# Patient Record
Sex: MALE | Race: WHITE | NOT HISPANIC OR LATINO | Employment: OTHER | URBAN - NONMETROPOLITAN AREA
[De-identification: names, ages, dates, MRNs, and addresses within clinical notes are randomized per-mention and may not be internally consistent; named-entity substitution may affect disease eponyms.]

---

## 2023-09-19 ENCOUNTER — OFFICE VISIT (OUTPATIENT)
Dept: PRIMARY CARE | Facility: CLINIC | Age: 73
End: 2023-09-19
Payer: MEDICARE

## 2023-09-19 VITALS
HEART RATE: 56 BPM | HEIGHT: 72 IN | DIASTOLIC BLOOD PRESSURE: 78 MMHG | BODY MASS INDEX: 36.96 KG/M2 | SYSTOLIC BLOOD PRESSURE: 138 MMHG | WEIGHT: 272.9 LBS

## 2023-09-19 DIAGNOSIS — E66.01 CLASS 2 SEVERE OBESITY WITH SERIOUS COMORBIDITY AND BODY MASS INDEX (BMI) OF 37.0 TO 37.9 IN ADULT, UNSPECIFIED OBESITY TYPE (MULTI): ICD-10-CM

## 2023-09-19 DIAGNOSIS — N40.0 BENIGN PROSTATIC HYPERPLASIA, UNSPECIFIED WHETHER LOWER URINARY TRACT SYMPTOMS PRESENT: ICD-10-CM

## 2023-09-19 DIAGNOSIS — F41.8 ANXIETY WITH DEPRESSION: ICD-10-CM

## 2023-09-19 DIAGNOSIS — H26.9 CATARACT, UNSPECIFIED CATARACT TYPE, UNSPECIFIED LATERALITY: ICD-10-CM

## 2023-09-19 DIAGNOSIS — Z00.00 ROUTINE GENERAL MEDICAL EXAMINATION AT HEALTH CARE FACILITY: ICD-10-CM

## 2023-09-19 DIAGNOSIS — I10 BENIGN HYPERTENSION: Primary | ICD-10-CM

## 2023-09-19 LAB
ESTIMATED AVERAGE GLUCOSE FOR HBA1C EXTERNAL: NORMAL
HEMOGLOBIN A1C/HEMOGLOBIN TOTAL IN BLOOD EXTERNAL: NORMAL

## 2023-09-19 PROCEDURE — 1036F TOBACCO NON-USER: CPT | Performed by: FAMILY MEDICINE

## 2023-09-19 PROCEDURE — 3075F SYST BP GE 130 - 139MM HG: CPT | Performed by: FAMILY MEDICINE

## 2023-09-19 PROCEDURE — 1170F FXNL STATUS ASSESSED: CPT | Performed by: FAMILY MEDICINE

## 2023-09-19 PROCEDURE — 3078F DIAST BP <80 MM HG: CPT | Performed by: FAMILY MEDICINE

## 2023-09-19 PROCEDURE — 99213 OFFICE O/P EST LOW 20 MIN: CPT | Performed by: FAMILY MEDICINE

## 2023-09-19 PROCEDURE — 1157F ADVNC CARE PLAN IN RCRD: CPT | Performed by: FAMILY MEDICINE

## 2023-09-19 PROCEDURE — 3008F BODY MASS INDEX DOCD: CPT | Performed by: FAMILY MEDICINE

## 2023-09-19 PROCEDURE — 1160F RVW MEDS BY RX/DR IN RCRD: CPT | Performed by: FAMILY MEDICINE

## 2023-09-19 PROCEDURE — 1159F MED LIST DOCD IN RCRD: CPT | Performed by: FAMILY MEDICINE

## 2023-09-19 PROCEDURE — 1123F ACP DISCUSS/DSCN MKR DOCD: CPT | Performed by: FAMILY MEDICINE

## 2023-09-19 PROCEDURE — G0439 PPPS, SUBSEQ VISIT: HCPCS | Performed by: FAMILY MEDICINE

## 2023-09-19 RX ORDER — SERTRALINE HYDROCHLORIDE 50 MG/1
50 TABLET, FILM COATED ORAL DAILY
COMMUNITY

## 2023-09-19 RX ORDER — LISINOPRIL AND HYDROCHLOROTHIAZIDE 10; 12.5 MG/1; MG/1
1 TABLET ORAL DAILY
COMMUNITY
Start: 2017-11-06 | End: 2023-09-19 | Stop reason: DRUGHIGH

## 2023-09-19 RX ORDER — LISINOPRIL AND HYDROCHLOROTHIAZIDE 10; 12.5 MG/1; MG/1
2 TABLET ORAL DAILY
Qty: 180 TABLET | Refills: 0 | Status: SHIPPED | OUTPATIENT
Start: 2023-09-19

## 2023-09-19 RX ORDER — TAMSULOSIN HYDROCHLORIDE 0.4 MG/1
0.4 CAPSULE ORAL DAILY
COMMUNITY
Start: 2017-11-06

## 2023-09-19 ASSESSMENT — ACTIVITIES OF DAILY LIVING (ADL)
BATHING: INDEPENDENT
GROCERY_SHOPPING: INDEPENDENT
DRESSING: INDEPENDENT
MANAGING_FINANCES: INDEPENDENT
TAKING_MEDICATION: INDEPENDENT
DOING_HOUSEWORK: INDEPENDENT

## 2023-09-19 ASSESSMENT — PATIENT HEALTH QUESTIONNAIRE - PHQ9
2. FEELING DOWN, DEPRESSED OR HOPELESS: NOT AT ALL
1. LITTLE INTEREST OR PLEASURE IN DOING THINGS: NOT AT ALL
SUM OF ALL RESPONSES TO PHQ9 QUESTIONS 1 AND 2: 0

## 2023-09-19 NOTE — PROGRESS NOTES
Subjective   Patient ID: Chemo Polanco is a 73 y.o. male who presents for pre op clearance for bilateral cataract surgery 10/5  HPI     Review of Systems    Objective   There were no vitals taken for this visit.    Physical Exam    Assessment/Plan

## 2023-09-19 NOTE — PROGRESS NOTES
Subjective   Reason for Visit: Chemo Polanco is an 73 y.o. male here for a Medicare Wellness visit.     Past Medical, Surgical, and Family History reviewed and updated in chart.    Reviewed all medications by prescribing practitioner or clinical pharmacist (such as prescriptions, OTCs, herbal therapies and supplements) and documented in the medical record.    Here to get re-established - last seen here in 2018.  He goes to [facility] for most of his primary care.  He has a h/o HTN, BPH, anxiety.    He is getting ready to have cataract surgery.  Otherwise he is doing well.   He has had several surgeries in the past - no issues with anesthesia.      Advance directives:. Advanced Care Planning discussed and documented advance care plan or surrogate decision maker documented in the medical record. Patient has living will. Patient has healthcare POA.     Colonoscopy 2017 - repeat recommended in 3 years due to family history.        Patient Care Team:  Niya Horvath MD as PCP - General (Family Medicine)         Objective   Vitals:  /78   Pulse 56   Ht 1.829 m (6')   Wt 124 kg (272 lb 14.4 oz)   BMI 37.01 kg/m²       Physical Exam  Vitals reviewed.   Constitutional:       General: He is not in acute distress.  Cardiovascular:      Rate and Rhythm: Normal rate and regular rhythm.      Heart sounds: No murmur heard.  Pulmonary:      Effort: Pulmonary effort is normal. No respiratory distress.      Breath sounds: Normal breath sounds.   Skin:     General: Skin is warm and dry.   Neurological:      General: No focal deficit present.      Mental Status: He is alert. Mental status is at baseline.         Assessment/Plan   Problem List Items Addressed This Visit       Benign hypertension - Primary    Benign prostatic hyperplasia    Anxiety with depression    Class 2 severe obesity with serious comorbidity and body mass index (BMI) of 37.0 to 37.9 in adult (CMS/[facility])     Other Visit Diagnoses       Cataract,  unspecified cataract type, unspecified laterality        Routine general medical examination at health care facility

## 2023-09-19 NOTE — PATIENT INSTRUCTIONS
He is medically stable for surgery.  [facility].  Follow up here in 1 year and prn.

## 2023-09-25 ENCOUNTER — TELEPHONE (OUTPATIENT)
Dept: PRIMARY CARE | Facility: CLINIC | Age: 73
End: 2023-09-25
Payer: MEDICARE

## 2023-09-25 DIAGNOSIS — Z01.818 PRE-OP EVALUATION: Primary | ICD-10-CM

## 2023-09-25 NOTE — TELEPHONE ENCOUNTER
Pt called.  wants him to have an EKG for her surgery clearance. Can you order this for him and he can have done at hosp.

## 2023-10-02 ENCOUNTER — PREP FOR PROCEDURE (OUTPATIENT)
Dept: OPERATING ROOM | Facility: HOSPITAL | Age: 73
End: 2023-10-02
Payer: MEDICARE

## 2023-10-02 DIAGNOSIS — H25.812 COMBINED FORMS OF AGE-RELATED CATARACT OF LEFT EYE: Primary | ICD-10-CM

## 2023-10-02 RX ORDER — TETRACAINE HYDROCHLORIDE 5 MG/ML
1 SOLUTION OPHTHALMIC ONCE
Status: CANCELLED | OUTPATIENT
Start: 2023-10-05

## 2023-10-02 RX ORDER — KETOROLAC TROMETHAMINE 5 MG/ML
1 SOLUTION OPHTHALMIC ONCE
Status: CANCELLED | OUTPATIENT
Start: 2023-10-05

## 2023-10-02 RX ORDER — POVIDONE-IODINE 5 %
1 SOLUTION, NON-ORAL OPHTHALMIC (EYE) ONCE
Status: CANCELLED | OUTPATIENT
Start: 2023-10-05

## 2023-10-02 RX ORDER — PREDNISOLONE ACETATE 10 MG/ML
1 SUSPENSION/ DROPS OPHTHALMIC ONCE
Status: DISCONTINUED | OUTPATIENT
Start: 2023-10-05 | End: 2023-10-10 | Stop reason: HOSPADM

## 2023-10-02 RX ORDER — MOXIFLOXACIN 5 MG/ML
1 SOLUTION/ DROPS OPHTHALMIC ONCE
Status: DISCONTINUED | OUTPATIENT
Start: 2023-10-05 | End: 2023-10-10 | Stop reason: HOSPADM

## 2023-10-02 NOTE — H&P (VIEW-ONLY)
History Of Present Illness  Chemo Polanco is a 73 y.o. male presenting with Combined form age related cataract left eye.     Past Medical History  Hypertension, BPH, Anxiety disorder    Surgical History  He has a past surgical history that includes Spine surgery; Cholecystectomy; Hernia repair; Total knee arthroplasty (Bilateral); and Hemorrhoid surgery. Gallbladder removal     Social History  He reports that he has quit smoking. His smoking use included cigarettes. He has never used smokeless tobacco. No history on file for alcohol use and drug use.    Family History  No family history on file.     Allergies  Patient has no known allergies.    Review of Systems   Constitutional: Negative.    HENT: Negative.     Eyes: Negative.    Respiratory: Negative.     Cardiovascular: Negative.         Hypertension   Gastrointestinal: Negative.    Endocrine: Negative.    Genitourinary: Negative.    Musculoskeletal: Negative.    Skin: Negative.    Allergic/Immunologic: Negative.    Neurological: Negative.    Hematological: Negative.    Psychiatric/Behavioral:  The patient is nervous/anxious.           Physical Exam     Last Recorded Vitals  There were no vitals taken for this visit.    Relevant Results             Assessment/Plan   Problem List Items Addressed This Visit             ICD-10-CM       Eye    Combined forms of age-related cataract of left eye - Primary H25.812    Relevant Orders    Case Request Operating Room: Extraction Cataract with Insertion Intraocular Lens (Completed)       Vijay Garcia MD

## 2023-10-05 ENCOUNTER — ANESTHESIA (OUTPATIENT)
Dept: OPERATING ROOM | Facility: HOSPITAL | Age: 73
End: 2023-10-05
Payer: MEDICARE

## 2023-10-05 ENCOUNTER — ANESTHESIA EVENT (OUTPATIENT)
Dept: OPERATING ROOM | Facility: HOSPITAL | Age: 73
End: 2023-10-05
Payer: MEDICARE

## 2023-10-05 ENCOUNTER — HOSPITAL ENCOUNTER (OUTPATIENT)
Facility: HOSPITAL | Age: 73
Setting detail: OUTPATIENT SURGERY
Discharge: HOME | End: 2023-10-05
Attending: OPHTHALMOLOGY | Admitting: OPHTHALMOLOGY
Payer: MEDICARE

## 2023-10-05 VITALS
DIASTOLIC BLOOD PRESSURE: 92 MMHG | HEART RATE: 44 BPM | BODY MASS INDEX: 36.7 KG/M2 | RESPIRATION RATE: 18 BRPM | OXYGEN SATURATION: 95 % | WEIGHT: 270.95 LBS | HEIGHT: 72 IN | SYSTOLIC BLOOD PRESSURE: 164 MMHG | TEMPERATURE: 97.4 F

## 2023-10-05 DIAGNOSIS — H25.812 COMBINED FORMS OF AGE-RELATED CATARACT OF LEFT EYE: Primary | ICD-10-CM

## 2023-10-05 PROCEDURE — 3600000003 HC OR TIME - INITIAL BASE CHARGE - PROCEDURE LEVEL THREE: Performed by: OPHTHALMOLOGY

## 2023-10-05 PROCEDURE — 3700000001 HC GENERAL ANESTHESIA TIME - INITIAL BASE CHARGE: Performed by: OPHTHALMOLOGY

## 2023-10-05 PROCEDURE — C1780 LENS, INTRAOCULAR (NEW TECH): HCPCS | Performed by: OPHTHALMOLOGY

## 2023-10-05 PROCEDURE — 3600000008 HC OR TIME - EACH INCREMENTAL 1 MINUTE - PROCEDURE LEVEL THREE: Performed by: OPHTHALMOLOGY

## 2023-10-05 PROCEDURE — 2760000001 HC OR 276 NO HCPCS: Performed by: OPHTHALMOLOGY

## 2023-10-05 PROCEDURE — 2500000001 HC RX 250 WO HCPCS SELF ADMINISTERED DRUGS (ALT 637 FOR MEDICARE OP): Performed by: OPHTHALMOLOGY

## 2023-10-05 PROCEDURE — 7100000010 HC PHASE TWO TIME - EACH INCREMENTAL 1 MINUTE: Performed by: OPHTHALMOLOGY

## 2023-10-05 PROCEDURE — 7100000009 HC PHASE TWO TIME - INITIAL BASE CHARGE: Performed by: OPHTHALMOLOGY

## 2023-10-05 PROCEDURE — 2720000007 HC OR 272 NO HCPCS: Performed by: OPHTHALMOLOGY

## 2023-10-05 PROCEDURE — 3700000002 HC GENERAL ANESTHESIA TIME - EACH INCREMENTAL 1 MINUTE: Performed by: OPHTHALMOLOGY

## 2023-10-05 PROCEDURE — 2500000004 HC RX 250 GENERAL PHARMACY W/ HCPCS (ALT 636 FOR OP/ED): Performed by: ANESTHESIOLOGY

## 2023-10-05 PROCEDURE — 2580000001 HC RX 258 IV SOLUTIONS: Performed by: ANESTHESIOLOGY

## 2023-10-05 DEVICE — IMPLANTABLE DEVICE: Type: IMPLANTABLE DEVICE | Site: EYE | Status: FUNCTIONAL

## 2023-10-05 RX ORDER — TETRACAINE HYDROCHLORIDE 5 MG/ML
1 SOLUTION OPHTHALMIC ONCE
Status: COMPLETED | OUTPATIENT
Start: 2023-10-05 | End: 2023-10-05

## 2023-10-05 RX ORDER — SODIUM CHLORIDE, SODIUM LACTATE, POTASSIUM CHLORIDE, CALCIUM CHLORIDE 600; 310; 30; 20 MG/100ML; MG/100ML; MG/100ML; MG/100ML
100 INJECTION, SOLUTION INTRAVENOUS CONTINUOUS
Status: CANCELLED | OUTPATIENT
Start: 2023-10-05

## 2023-10-05 RX ORDER — POVIDONE-IODINE 5 %
1 SOLUTION, NON-ORAL OPHTHALMIC (EYE) ONCE
Status: COMPLETED | OUTPATIENT
Start: 2023-10-05 | End: 2023-10-05

## 2023-10-05 RX ORDER — KETOROLAC TROMETHAMINE 5 MG/ML
1 SOLUTION OPHTHALMIC ONCE
Status: COMPLETED | OUTPATIENT
Start: 2023-10-05 | End: 2023-10-05

## 2023-10-05 RX ORDER — MIDAZOLAM HYDROCHLORIDE 1 MG/ML
INJECTION, SOLUTION INTRAMUSCULAR; INTRAVENOUS AS NEEDED
Status: DISCONTINUED | OUTPATIENT
Start: 2023-10-05 | End: 2023-10-05

## 2023-10-05 RX ADMIN — TETRACAINE HYDROCHLORIDE 1 DROP: 5 SOLUTION OPHTHALMIC at 10:39

## 2023-10-05 RX ADMIN — PHENYLEPHRINE HYDROCHLORIDE 1 DROP: 100 SOLUTION/ DROPS OPHTHALMIC at 10:40

## 2023-10-05 RX ADMIN — MIDAZOLAM 2 MG: 1 INJECTION INTRAMUSCULAR; INTRAVENOUS at 11:46

## 2023-10-05 RX ADMIN — POLYVINYL ALCOHOL, POVIDONE 1 DROP: 14; 6 SOLUTION/ DROPS OPHTHALMIC at 10:40

## 2023-10-05 RX ADMIN — POVIDONE-IODINE 1 APPLICATION: 5 SOLUTION OPHTHALMIC at 10:40

## 2023-10-05 RX ADMIN — SODIUM CHLORIDE, SODIUM LACTATE, POTASSIUM CHLORIDE, AND CALCIUM CHLORIDE: 600; 310; 30; 20 INJECTION, SOLUTION INTRAVENOUS at 11:56

## 2023-10-05 RX ADMIN — KETOROLAC TROMETHAMINE 1 DROP: 5 SOLUTION OPHTHALMIC at 10:40

## 2023-10-05 SDOH — HEALTH STABILITY: MENTAL HEALTH: CURRENT SMOKER: 0

## 2023-10-05 ASSESSMENT — ENCOUNTER SYMPTOMS
GASTROINTESTINAL NEGATIVE: 1
NEUROLOGICAL NEGATIVE: 1
ALLERGIC/IMMUNOLOGIC NEGATIVE: 1
CARDIOVASCULAR NEGATIVE: 1
CONSTITUTIONAL NEGATIVE: 1
RESPIRATORY NEGATIVE: 1
HEMATOLOGIC/LYMPHATIC NEGATIVE: 1
ENDOCRINE NEGATIVE: 1
NERVOUS/ANXIOUS: 1
EYES NEGATIVE: 1
MUSCULOSKELETAL NEGATIVE: 1

## 2023-10-05 ASSESSMENT — COLUMBIA-SUICIDE SEVERITY RATING SCALE - C-SSRS
6. HAVE YOU EVER DONE ANYTHING, STARTED TO DO ANYTHING, OR PREPARED TO DO ANYTHING TO END YOUR LIFE?: NO
1. IN THE PAST MONTH, HAVE YOU WISHED YOU WERE DEAD OR WISHED YOU COULD GO TO SLEEP AND NOT WAKE UP?: NO
2. HAVE YOU ACTUALLY HAD ANY THOUGHTS OF KILLING YOURSELF?: NO

## 2023-10-05 ASSESSMENT — PAIN - FUNCTIONAL ASSESSMENT
PAIN_FUNCTIONAL_ASSESSMENT: 0-10
PAIN_FUNCTIONAL_ASSESSMENT: 0-10

## 2023-10-05 ASSESSMENT — PAIN SCALES - GENERAL
PAINLEVEL_OUTOF10: 0 - NO PAIN
PAIN_LEVEL: 0
PAINLEVEL_OUTOF10: 0 - NO PAIN

## 2023-10-05 NOTE — ANESTHESIA PREPROCEDURE EVALUATION
Patient: Chemo Polanco    Procedure Information       Date/Time: 10/05/23 1130    Procedure: CATARACT EXTRACTION W/IOL IMPLANT L EYE (Left: Eye)    Location: [facility] OR 05 / [facility] OR    Surgeons: Vijay Garcia MD            Relevant Problems   Anesthesia (within normal limits)      Cardiovascular   (+) Benign hypertension      Endocrine   (+) Class 2 severe obesity with serious comorbidity and body mass index (BMI) of 37.0 to 37.9 in adult (CMS/HCC)      GI (within normal limits)      Neuro/Psych   (+) Anxiety with depression      Eyes, Ears, Nose, and Throat (within normal limits)       Clinical information reviewed:   Tobacco  Allergies  Meds   Med Hx  Surg Hx   Fam Hx  Soc Hx        NPO Detail:  NPO/Void Status  Date of Last Liquid: 10/04/23  Time of Last Liquid: 2100  Date of Last Solid: 10/04/23  Time of Last Solid: 1900  Time of Last Void: 0900         PHYSICAL EXAM    Anesthesia Plan    ASA 2     MAC     The patient is not a current smoker.  Patient was not previously instructed to abstain from smoking on day of procedure.  Patient did not smoke on day of procedure.    intravenous induction   Anesthetic plan and risks discussed with patient.

## 2023-10-05 NOTE — OP NOTE
CATARACT EXTRACTION W/IOL IMPLANT L EYE (L) Operative Note     Date: 10/5/2023  OR Location: [facility] OR    Name: Chemo Polanco, : 1950, Age: 73 y.o., MRN: [id number], Sex: male    Diagnosis  Pre-op Diagnosis     * Combined forms of age-related cataract of left eye [H25.812] Post-op Diagnosis     * Combined forms of age-related cataract of left eye [H25.812]     Procedures  CATARACT EXTRACTION W/IOL IMPLANT L EYE  78969 - VT XCAPSL CTRC RMVL INSJ IO LENS PROSTH W/O ECP      Surgeons      * Vijay Garcia - Primary    Resident/Fellow/Other Assistant:  No surgical staff documented.    Procedure Summary  Anesthesia: Monitor Anesthesia Care  ASA: II  Anesthesia Staff: Anesthesiologist: Jerome Valiente MD    Intra-op Medications: * No intraprocedure medications in log *    Anesthesia Record        Intraprocedure I/O Totals       Intake    .00 mL    Total Intake 400 mL     Specimen: No specimens collected     Staff:   Circulator: Juan Ramon Swanson RN  Scrub Person: Jarad Mason RN    Implants:  Implants       Type Name Action Serial No.      Lens LENS, INTRAOCULAR, SN60WF 18.5 ALOK - [id number] - [id number] Implanted [id number]           Findings: Combined Form Age Related Cataract Left Eye    Indications: Chemo Polanco is an 73 y.o. male who is having surgery for Combined forms of age-related cataract of left eye [H25.812].     The patient was correctly identified in the preop area and the operative eye was marked with a marking pen. The operative eye was dilated in the preoperative area.  The patient was then taken to the operating room where timeout was performed before starting the procedure. Combined anesthesia  with intravenous sedation and topical tetracaine eyedrops were given the left eye. The operative eye was prepped and draped in the standard sterile ophthalmic fashion in  preparation for ophthalmic surgery.  A Roman wire speculum was then inserted between the eyelids of the left eye and the  operating microscope was placed over the left eye.  A paracentesis incision was made approximately 30° away from the planned surgical incision site with the help of MVR blade.  1% lidocaine MPF with Phenylephrine 1.5% PF was injected into the anterior chamber through the paracentesis incision. A near limbal clear corneal incision was fashioned in the temporal quadrant just outside the vascular arcade and Viscoat was injected into anterior chamber to firm the eye. A bent needle cystotome was used and Utrata forceps were utilized to create a continuous curvilinear capsulorrhexis.  BSS was injected beneath the anterior capsule to hydrodissect the nucleus from adjacent cortex and capsule.  The residual cortex were then aspirated with irrigation aspiration handpiece. The posterior capsule was then polished with the help of soft irrigation-aspiration tip.  Provisc viscoelastic was then injected into the eye to reform the anterior chamber and to open the capsular bag.  The intraocular lens implant was taken from its sterile wrapping, inspected under the surgical microscope and found to be in good condition. The intraocular lens implant 18.5D was injected into the capsule bag. The Provisc was then aspirated from the anterior chamber and from behind the intraocular lens implant.  The anterior chamber was inflated with the help of BSS to moderate tension.  The edges of the surgical incision were then hydrated with the help of BSS.  Vigamox was then injected into the anterior chamber and into the capsule bag through the paracentesis incision. The surgical wound was then inspected and found to be watertight.  The wire speculum and drapes were then removed.  Pred Forte eyedrops, Acular eyedrops and Betadine 5% sterile ophthalmic solution were instilled in the conjunctival sac.     The patient tolerated the procedure well and was taken to recovery room in stable condition.    Attending Attestation: I performed the  procedure.    Vijay Garcia  Phone Number: [phone]

## 2023-10-05 NOTE — ANESTHESIA POSTPROCEDURE EVALUATION
Patient: Chemo Polanco    Procedure Summary       Date: 10/05/23 Room / Location: [facility] OR 05 / [facility] OR    Anesthesia Start: 1142 Anesthesia Stop:     Procedure: CATARACT EXTRACTION W/IOL IMPLANT L EYE (Left: Eye) Diagnosis:       Combined forms of age-related cataract of left eye      (Combined forms of age-related cataract of left eye [H25.812])    Surgeons: Vijay Garcia MD Responsible Provider: Jerome Valiente MD    Anesthesia Type: MAC ASA Status: 2            Anesthesia Type: MAC    Vitals Value Taken Time   BP     Temp     Pulse     Resp     SpO2         Anesthesia Post Evaluation    Patient location during evaluation: PACU  Patient participation: complete - patient participated  Level of consciousness: awake  Pain score: 0  Pain management: adequate  Airway patency: patent  Cardiovascular status: acceptable  Respiratory status: acceptable  Hydration status: acceptable        No notable events documented.

## 2023-10-19 ENCOUNTER — PREP FOR PROCEDURE (OUTPATIENT)
Dept: OPERATING ROOM | Facility: HOSPITAL | Age: 73
End: 2023-10-19
Payer: MEDICARE

## 2023-10-19 DIAGNOSIS — H25.811 COMBINED FORMS OF AGE-RELATED CATARACT OF RIGHT EYE: Primary | ICD-10-CM

## 2023-10-19 RX ORDER — KETOROLAC TROMETHAMINE 5 MG/ML
1 SOLUTION OPHTHALMIC
Status: CANCELLED | OUTPATIENT
Start: 2023-11-09 | End: 2023-11-09

## 2023-10-19 RX ORDER — PREDNISOLONE ACETATE 10 MG/ML
1 SUSPENSION/ DROPS OPHTHALMIC ONCE
Status: CANCELLED | OUTPATIENT
Start: 2023-11-09

## 2023-10-19 RX ORDER — TETRACAINE HYDROCHLORIDE 5 MG/ML
1 SOLUTION OPHTHALMIC ONCE
Status: CANCELLED | OUTPATIENT
Start: 2023-11-09

## 2023-10-19 RX ORDER — POVIDONE-IODINE 5 %
SOLUTION, NON-ORAL OPHTHALMIC (EYE) ONCE
Status: CANCELLED | OUTPATIENT
Start: 2023-11-09

## 2023-10-28 ENCOUNTER — OFFICE VISIT (OUTPATIENT)
Dept: URGENT CARE | Facility: CLINIC | Age: 73
End: 2023-10-28
Payer: MEDICARE

## 2023-10-28 VITALS
BODY MASS INDEX: 37.19 KG/M2 | DIASTOLIC BLOOD PRESSURE: 73 MMHG | SYSTOLIC BLOOD PRESSURE: 145 MMHG | HEIGHT: 72 IN | WEIGHT: 274.6 LBS | TEMPERATURE: 98.5 F | RESPIRATION RATE: 18 BRPM | HEART RATE: 55 BPM | OXYGEN SATURATION: 93 %

## 2023-10-28 DIAGNOSIS — J06.9 UPPER RESPIRATORY TRACT INFECTION, UNSPECIFIED TYPE: Primary | ICD-10-CM

## 2023-10-28 PROCEDURE — 99214 OFFICE O/P EST MOD 30 MIN: CPT | Performed by: PHYSICIAN ASSISTANT

## 2023-10-28 PROCEDURE — 99214 OFFICE O/P EST MOD 30 MIN: CPT

## 2023-10-28 RX ORDER — AZITHROMYCIN 250 MG/1
250 TABLET, FILM COATED ORAL DAILY
Qty: 6 TABLET | Refills: 0 | Status: SHIPPED | OUTPATIENT
Start: 2023-10-28 | End: 2023-12-29 | Stop reason: ALTCHOICE

## 2023-10-28 RX ORDER — METHYLPREDNISOLONE 4 MG/1
TABLET ORAL
Qty: 21 TABLET | Refills: 0 | Status: SHIPPED | OUTPATIENT
Start: 2023-10-28 | End: 2023-12-29 | Stop reason: ALTCHOICE

## 2023-10-28 NOTE — PROGRESS NOTES
Subjective   Patient ID: Chemo Polanco is a 73 y.o. male who presents for Cough (Cough, nasal drainage x 1 week ).  HPI  Presents for evaluation of URI.  Symptoms including cough, congestion have been present for several days and refractory to OTC meds.  No fever, chills, nausea, vomiting, abdominal pain, CP, or SOB.  No exacerbating factors    Review of Systems    Constitutional:  See HPI   ENT: See HPI  Respiratory: See HPI  Neurologic:  Alert and oriented X4, No numbness, No tingling.    All other systems are negative     Objective     /73 (BP Location: Right arm, Patient Position: Sitting, BP Cuff Size: Large adult)   Pulse 55   Temp 36.9 °C (98.5 °F) (Temporal)   Resp 18   Ht 1.829 m (6')   Wt 125 kg (274 lb 9.6 oz)   SpO2 93%   BMI 37.24 kg/m²     Physical Exam  General:  Alert and oriented, No acute distress.    Eye:  Pupils are equal, round and reactive to light, Extraocular movements are intact, Normal conjunctiva.    HENT:  Normocephalic, Normal hearing, Oral mucosa is moist, No pharyngeal erythema, No sinus tenderness.    Neck:  Supple, Non-tender, No lymphadenopathy.    Respiratory: Bilateral mild scattered coarse breath sounds without overt wheezing or rhonchi; respirations are non-labored, Breath sounds are equal    Cardiovascular:  Normal rate, Regular rhythm.    Gastrointestinal:  Non-distended.    Musculoskeletal: Normal range of motion, Normal strength, No tenderness, No swelling, No deformity, Normal gait.     Integumentary:  Warm, Dry, Intact, No pallor, No rash.    Neurologic:  Alert, Oriented, Normal sensory, Normal motor function, No focal deficits, Cranial Nerves II-XII are grossly intact   Psychiatric:  Cooperative, Appropriate mood & affect.    Assessment/Plan   Exam is consistent with upper respiratory infection.  Prescriptions for Z-Rafi and Medrol Dosepak.  Patient declined cough syrup.  Rest and supportive care otherwise.  Patient's clinical presentation is otherwise  unremarkable at this time. Patient is discharged with instructions to follow-up with primary care or seek emergency medical attention for worsening symptoms or any new concerns.  Problem List Items Addressed This Visit    None  Visit Diagnoses       Upper respiratory tract infection, unspecified type    -  Primary    Relevant Medications    azithromycin (Zithromax) 250 mg tablet    methylPREDNISolone (Medrol Dospak) 4 mg tablets            Final diagnoses:   [J06.9] Upper respiratory tract infection, unspecified type

## 2023-11-07 NOTE — PREPROCEDURE INSTRUCTIONS
No outpatient medications have been marked as taking for the 11/9/23 encounter (Hospital Encounter).                       NPO Instructions:    Nothing to eat or drink after midnight    Additional Instructions:     Will need  home. Will receive call day before surgery with arrival time

## 2023-11-08 ENCOUNTER — ANESTHESIA EVENT (OUTPATIENT)
Dept: OPERATING ROOM | Facility: HOSPITAL | Age: 73
End: 2023-11-08

## 2023-11-09 ENCOUNTER — HOSPITAL ENCOUNTER (OUTPATIENT)
Facility: HOSPITAL | Age: 73
Setting detail: OUTPATIENT SURGERY
Discharge: HOME | End: 2023-11-09
Attending: OPHTHALMOLOGY | Admitting: OPHTHALMOLOGY
Payer: MEDICARE

## 2023-11-09 ENCOUNTER — ANESTHESIA (OUTPATIENT)
Dept: OPERATING ROOM | Facility: HOSPITAL | Age: 73
End: 2023-11-09
Payer: MEDICARE

## 2023-11-09 VITALS
BODY MASS INDEX: 36.82 KG/M2 | TEMPERATURE: 97.6 F | HEIGHT: 72 IN | DIASTOLIC BLOOD PRESSURE: 75 MMHG | WEIGHT: 271.83 LBS | HEART RATE: 57 BPM | RESPIRATION RATE: 14 BRPM | SYSTOLIC BLOOD PRESSURE: 136 MMHG | OXYGEN SATURATION: 99 %

## 2023-11-09 PROBLEM — H25.811 COMBINED FORMS OF AGE-RELATED CATARACT OF RIGHT EYE: Status: RESOLVED | Noted: 2023-10-19 | Resolved: 2023-11-09

## 2023-11-09 PROCEDURE — 3700000001 HC GENERAL ANESTHESIA TIME - INITIAL BASE CHARGE: Performed by: OPHTHALMOLOGY

## 2023-11-09 PROCEDURE — 2500000004 HC RX 250 GENERAL PHARMACY W/ HCPCS (ALT 636 FOR OP/ED): Performed by: ANESTHESIOLOGY

## 2023-11-09 PROCEDURE — 3600000008 HC OR TIME - EACH INCREMENTAL 1 MINUTE - PROCEDURE LEVEL THREE: Performed by: OPHTHALMOLOGY

## 2023-11-09 PROCEDURE — 3600000003 HC OR TIME - INITIAL BASE CHARGE - PROCEDURE LEVEL THREE: Performed by: OPHTHALMOLOGY

## 2023-11-09 PROCEDURE — 2760000001 HC OR 276 NO HCPCS: Performed by: OPHTHALMOLOGY

## 2023-11-09 PROCEDURE — C1780 LENS, INTRAOCULAR (NEW TECH): HCPCS | Performed by: OPHTHALMOLOGY

## 2023-11-09 PROCEDURE — 3700000002 HC GENERAL ANESTHESIA TIME - EACH INCREMENTAL 1 MINUTE: Performed by: OPHTHALMOLOGY

## 2023-11-09 PROCEDURE — 2720000007 HC OR 272 NO HCPCS: Performed by: OPHTHALMOLOGY

## 2023-11-09 PROCEDURE — 7100000010 HC PHASE TWO TIME - EACH INCREMENTAL 1 MINUTE: Performed by: OPHTHALMOLOGY

## 2023-11-09 PROCEDURE — 7100000009 HC PHASE TWO TIME - INITIAL BASE CHARGE: Performed by: OPHTHALMOLOGY

## 2023-11-09 PROCEDURE — 2500000001 HC RX 250 WO HCPCS SELF ADMINISTERED DRUGS (ALT 637 FOR MEDICARE OP): Performed by: OPHTHALMOLOGY

## 2023-11-09 DEVICE — IMPLANTABLE DEVICE: Type: IMPLANTABLE DEVICE | Site: EYE | Status: FUNCTIONAL

## 2023-11-09 RX ORDER — POVIDONE-IODINE 5 %
SOLUTION, NON-ORAL OPHTHALMIC (EYE) ONCE
Status: COMPLETED | OUTPATIENT
Start: 2023-11-09 | End: 2023-11-09

## 2023-11-09 RX ORDER — KETOROLAC TROMETHAMINE 5 MG/ML
1 SOLUTION OPHTHALMIC
Status: COMPLETED | OUTPATIENT
Start: 2023-11-09 | End: 2023-11-09

## 2023-11-09 RX ORDER — SODIUM CHLORIDE, SODIUM LACTATE, POTASSIUM CHLORIDE, CALCIUM CHLORIDE 600; 310; 30; 20 MG/100ML; MG/100ML; MG/100ML; MG/100ML
100 INJECTION, SOLUTION INTRAVENOUS CONTINUOUS
Status: DISCONTINUED | OUTPATIENT
Start: 2023-11-09 | End: 2023-11-09 | Stop reason: HOSPADM

## 2023-11-09 RX ORDER — MIDAZOLAM HYDROCHLORIDE 1 MG/ML
2 INJECTION INTRAMUSCULAR; INTRAVENOUS ONCE AS NEEDED
Status: COMPLETED | OUTPATIENT
Start: 2023-11-09 | End: 2023-11-09

## 2023-11-09 RX ORDER — MIDAZOLAM HYDROCHLORIDE 1 MG/ML
INJECTION, SOLUTION INTRAMUSCULAR; INTRAVENOUS AS NEEDED
Status: DISCONTINUED | OUTPATIENT
Start: 2023-11-09 | End: 2023-11-09

## 2023-11-09 RX ORDER — TETRACAINE HYDROCHLORIDE 5 MG/ML
1 SOLUTION OPHTHALMIC ONCE
Status: COMPLETED | OUTPATIENT
Start: 2023-11-09 | End: 2023-11-09

## 2023-11-09 RX ADMIN — KETOROLAC TROMETHAMINE 1 DROP: 5 SOLUTION OPHTHALMIC at 12:31

## 2023-11-09 RX ADMIN — HYPROMELLOSE 2910 1 DROP: 5 SOLUTION/ DROPS OPHTHALMIC at 12:30

## 2023-11-09 RX ADMIN — PHENYLEPHRINE HYDROCHLORIDE 1 DROP: 100 SOLUTION/ DROPS OPHTHALMIC at 12:31

## 2023-11-09 RX ADMIN — KETOROLAC TROMETHAMINE 1 DROP: 5 SOLUTION OPHTHALMIC at 12:50

## 2023-11-09 RX ADMIN — POVIDONE-IODINE: 5 SOLUTION OPHTHALMIC at 12:25

## 2023-11-09 RX ADMIN — PHENYLEPHRINE HYDROCHLORIDE 1 DROP: 100 SOLUTION/ DROPS OPHTHALMIC at 12:25

## 2023-11-09 RX ADMIN — SODIUM CHLORIDE, POTASSIUM CHLORIDE, SODIUM LACTATE AND CALCIUM CHLORIDE 100 ML/HR: 600; 310; 30; 20 INJECTION, SOLUTION INTRAVENOUS at 12:25

## 2023-11-09 RX ADMIN — MIDAZOLAM 1 MG: 1 INJECTION INTRAMUSCULAR; INTRAVENOUS at 13:03

## 2023-11-09 RX ADMIN — HYPROMELLOSE 2910 1 DROP: 5 SOLUTION/ DROPS OPHTHALMIC at 12:35

## 2023-11-09 RX ADMIN — PHENYLEPHRINE HYDROCHLORIDE 1 DROP: 100 SOLUTION/ DROPS OPHTHALMIC at 12:50

## 2023-11-09 RX ADMIN — KETOROLAC TROMETHAMINE 1 DROP: 5 SOLUTION OPHTHALMIC at 12:25

## 2023-11-09 RX ADMIN — KETOROLAC TROMETHAMINE 1 DROP: 5 SOLUTION OPHTHALMIC at 12:48

## 2023-11-09 RX ADMIN — MIDAZOLAM HYDROCHLORIDE 2 MG: 1 INJECTION, SOLUTION INTRAMUSCULAR; INTRAVENOUS at 12:52

## 2023-11-09 RX ADMIN — MIDAZOLAM 1 MG: 1 INJECTION INTRAMUSCULAR; INTRAVENOUS at 12:58

## 2023-11-09 RX ADMIN — PHENYLEPHRINE HYDROCHLORIDE 1 DROP: 100 SOLUTION/ DROPS OPHTHALMIC at 12:48

## 2023-11-09 RX ADMIN — TETRACAINE HYDROCHLORIDE 1 DROP: 5 SOLUTION/ DROPS OPHTHALMIC at 12:25

## 2023-11-09 RX ADMIN — HYPROMELLOSE 2910 1 DROP: 5 SOLUTION/ DROPS OPHTHALMIC at 12:40

## 2023-11-09 RX ADMIN — HYPROMELLOSE 2910 1 DROP: 5 SOLUTION/ DROPS OPHTHALMIC at 12:45

## 2023-11-09 SDOH — HEALTH STABILITY: MENTAL HEALTH: CURRENT SMOKER: 0

## 2023-11-09 ASSESSMENT — COLUMBIA-SUICIDE SEVERITY RATING SCALE - C-SSRS
1. IN THE PAST MONTH, HAVE YOU WISHED YOU WERE DEAD OR WISHED YOU COULD GO TO SLEEP AND NOT WAKE UP?: NO
2. HAVE YOU ACTUALLY HAD ANY THOUGHTS OF KILLING YOURSELF?: NO
6. HAVE YOU EVER DONE ANYTHING, STARTED TO DO ANYTHING, OR PREPARED TO DO ANYTHING TO END YOUR LIFE?: NO

## 2023-11-09 ASSESSMENT — PAIN SCALES - GENERAL
PAINLEVEL_OUTOF10: 0 - NO PAIN
PAIN_LEVEL: 3
PAINLEVEL_OUTOF10: 0 - NO PAIN

## 2023-11-09 ASSESSMENT — PAIN - FUNCTIONAL ASSESSMENT
PAIN_FUNCTIONAL_ASSESSMENT: 0-10
PAIN_FUNCTIONAL_ASSESSMENT: 0-10

## 2023-11-09 NOTE — H&P
H&P Notes  - documented in this encounter  Vijay Garcia MD - 10/31/2023 11:32 AM EDT  Formatting of this note is different from the original.  HISTORY AND PHYSICAL EXAMINATION    SERVICE DATE: 10/31/2023  SERVICE TIME: 11:33 AM    PRIMARY CARE PHYSICIAN: Demetra Trevino NP, CNP    REASON FOR VISIT:  Chemo Polanco is a 73 year old male who is being seen for Combined Age-related Cataract Right Eye.    The patient has the following:  ACTIVE PROBLEM LIST  Combined Forms of Age-Related Cataract of Right Eye  Essential Hypertension  Benign Prostatic Hyperplasia  Status Post Cataract Extraction and Insertion of Intraocular Lens of Left Eye    SUBJECTIVE  CHIEF COMPLAINT: Blurred vision for distance and near Right Eye.    HPI:  PAST MEDICAL HISTORY  Diagnosis Date  BPH (benign prostatic hyperplasia)  Essential hypertension  Generalized anxiety disorder    PAST SURGICAL HISTORY  Procedure Laterality Date  PAST SURGICAL HISTORY OF  spinal surgery for stenosis  REMOVAL GALLBLADDER  REMV CATARACT EXTRACAP,INSERT LENS Left 10/05/2023  SN60WF +18.5 D  TOTAL KNEE REPLACEMENT Bilateral    FAMILY HISTORY  Problem Relation Age of Onset  No Ocular Disease No Family History    SOCIAL HISTORY:  Social History    Tobacco Use  Smoking status: Former  Years: 10  Types: Cigarettes  Quit date:   Years since quittin.8  Smokeless tobacco: Never    MEDICATIONS:  Prior to Admission medications as of 10/31/23 1118  Medication Sig Last Dose Taking  fluorometholone (FML LIQUID FILM) 0.1 % ophthalmic suspension Use 1 Drop in the right eye three times a day. Taking Yes  tamsulosin (FLOMAX) 0.4 mg Take 1 tablet by mouth once daily. Taking Yes  sertraline (ZOLOFT) 50 mg tablet 50 mg. Taking Yes  naproxen (NAPROSYN) 500 mg tablet Taking Yes  meloxicam (MOBIC) 7.5 mg tablet Take 1 tablet by mouth every afternoon. Taking Yes  lisinopril-hydroCHLOROthiazide (ZESTORETIC) 10-12.5 mg per tablet Taking Yes  gabapentin (NEURONTIN) 100 mg capsule  100 mg. Taking Yes  cetirizine (ZYRTEC) 5 mg tablet Take 1 tablet by mouth once daily as needed. Taking Yes  keTORolac (ACULAR) 0.5 % ophthalmic solution Use 1 Drop in the right eye three times a day.  prednisoLONE acetate (PRED FORTE) 1 % ophthalmic suspension Use 1 Drop in the left eye three times a day.    No medication comments found.    CURRENT ALLERGIES: ALLERGIES  No Known Allergies    REVIEW OF SYSTEMS:  PAIN ASSESSMENT:  General: No weight loss, malaise or fevers.  Neuro: No Hx of stroke or seizures  Respiratory: No history of current cough or dyspnea, or pneumonia in the past 6 weeks. No history of respiratory/pulmonary symptoms or problems  Cardiovascular: Positive for: Hypertension  GI: No history of GI symptoms or problems. No history of esophageal varices, recent ascites, or ETOH greater than 2 drinks per day.  : No history of UTI in past 6 weeks. No history of renal failure. Not currently on or requiring dialysis. No history of  symptoms or problems.  GYN: Negative for abnormal vaginal bleeding, abnormal vaginal discharge., N/A  Pregnancy: N/A  Endocrine: No history of diabetes. Has not taken steroids within the past 30 days. No history of endocrinological symptoms or problems.  Hematology: No history of bleeding or clotting disorder. Pt is not taking anti-coagulation or platelet medications. No history of hematological symptoms or problems.  Oncology: No history of CA metastasis, chemo within 30 days, or radiotherapy within 90 days. Has not lost 10% of body wt in 6 months. No history of oncological symptoms or problems.  Psych: No history of psychiatric symptoms or problems.  Musculoskeletal: Negative for joint pain or swelling, back pain or muscle pain.  Skin: Negative for lesions, rash and itching.    PHYSICAL EXAM:  VITALS:  /78  Pulse 56        General: Alert and oriented  Skin: Normal color, no rash, no lesions.  HEENT: EOM, pupils equal, round and reactive.  Cardiovascular: Normal S1  & S2, no rubs, murmurs or gallops. No JVD. Pulse regular.  Lungs: Normal breath sounds, no wheezes or crackles.  Abdomen: Soft, non-tender, no rigidity.  Extremities: No deformity, no edema or tenderness, no joint swelling or clubbing.  Neurological: Normal cognition and motor skills.  Pulses: Carotid and radial pulses normal +2.    Diagnostic tests reviewed for today's visit:  No new labs or tests    ASSESSMENT  Medication and Non-Pharmacologic VTE Prophylaxis/Anticoagulants      VTE Prophylaxis: VTE prophylaxis appropriate    Impression: There is no known pertinent medical condition which may affect hernan-operative course    Clinical Risk Factors for Possible Cardiac Complications:  None    Patient is scheduled for a low-risk procedure.    FUNCTIONAL STATUS: Do yardwork, such as raking leaves, weeding,or pushing a power mower (4.50 METs)    Functional Class (NYHA): N/A    [employer]: Not obtained    PLAN  CONSULTS:  Patient does not require consults for optimization at this time.    The Following Tests/Procedures Have Been Initiated:  None    Instructions Given to Patient:  Patient given verbal and written preop instructions and voices comprehension and compliance.    SIGNATURE: Vijay Garcia MD PATIENT NAME: Chemo Polanco  DATE: October 31, 2023 MRN: [id number]  TIME: 11:33 AM PAGER/CONTACT #:    Electronically signed by Vijay Garcia MD at 10/31/2023 11:35 AM EDT   Source Comments  - [facility]  In the event this information is protected by the Federal Confidentiality of Alcohol and Drug Abuse Patient Records regulations: This information has been disclosed to you from records protected by Federal confidentiality rules (42 CFR Part 2). The Federal rules prohibit you from making any further disclosure of this information unless further disclosure is expressly permitted by the written consent of the person to whom it pertains or as otherwise permitted by 42 CFR Part 2. A general authorization for the  release of medical or other information is NOT sufficient for this purpose. The Federal rules restrict any use of the information to criminally investigate or prosecute any alcohol or drug abuse patient.  Reason for Visit    Reason for Visit -  Reason Comments   Blurred Vision Right Eye     Difficulty Reading Right Eye     Glare Right Eye     Encounter Details    Encounter Details  Date Type Department Care Team Description   10/31/2023 11:00 AM EDT Office Visit [facility] Ophthalmology   [address] [place] [address]   [phone]  Vijay Garcia MD   [address] [id number]   [phone] (Work)   [phone] (Fax)  Combined forms of age-related cataract of right eye (Primary Dx);  Status post cataract extraction and insertion of intraocular lens of left eye;  Essential hypertension;  Benign prostatic hyperplasia, unspecified whether lower urinary tract symptoms present     Social History  - documented as of this encounter  Social History  Tobacco Use Types Packs/Day Years Used Date   Smoking Tobacco: Former Cigarettes   10 Quit: 1974   Smokeless Tobacco: Never             Social History  Sex and Gender Information Value Date Recorded   Sex Assigned at Birth Not on file     Gender Identity Not on file     Sexual Orientation Not on file       Last Filed Vital Signs  - documented in this encounter  Last Filed Vital Signs  Vital Sign Reading Time Taken Comments   Blood Pressure 138/78 10/31/2023 11:16 AM EDT     Pulse 56 10/31/2023 11:16 AM EDT     Temperature - -     Respiratory Rate - -     Oxygen Saturation - -     Inhaled Oxygen Concentration - -     Weight - -     Height - -     Body Mass Index - -       Patient Instructions  - documented in this encounter  Patient Instructions  Vijay Garcia MD - 10/31/2023 11:22 AM EDT   Formatting of this note is different from the original.  Plan Cataract Surgery with Monofocal Intraocular Lens Implant right Eye on 11/09/2023 at [facility].    Current Ophthalmic Meds        fluorometholone (FML LIQUID FILM) 0.1 % ophthalmic suspension Use 1 Drop in the right eye three times a day.  keTORolac (ACULAR) 0.5 % ophthalmic solution Use 1 Drop in the right eye three times a day.  prednisoLONE acetate (PRED FORTE) 1 % ophthalmic suspension Use 1 Drop in the left eye three times a day.    Continue:  Systane Complete solution instill 1 drop 3 times daily Both Eyes.    If you have any questions please contact our office at [phone].  After office hours or on the weekend, please call Dr. Garcia on his cell phone at [phone].    Electronically signed by Vijay Garcia MD at 10/31/2023 11:22 AM EDT     Ordered Prescriptions  - documented in this encounterReconcile with Patient's Chart  Ordered Prescriptions  Prescription Sig Dispensed Refills Start Date End Date   prednisoLONE acetate (PRED FORTE) 1 % ophthalmic suspension  Use 1 Drop in the left eye three times a day. 5 mL  2 10/31/2023     keTORolac (ACULAR) 0.5 % ophthalmic solution  Use 1 Drop in the right eye three times a day. 5 mL  2 10/31/2023       Progress Notes  - documented in this encounter  Vijay Garcia MD - 10/31/2023 11:18 AM EDT  Formatting of this note is different from the original.  ASSESSMENT/PLAN:  1. Combined forms of age-related cataract of right eye - ICD9: 366.19, ICD10: H25.811 (primary diagnosis)    Cataract Presurgical Documentation    Cataract: Right Eye    Current Visual Acuity  Right Eye Distance CC 20/50  Left Eye Distance SC 20/20    Visual Function: Chemo Polanco states that the decline in vision from the cataract impedes his abilities as listed in the HPI, as well as other activities of daily living.    Chemo Polanco has confirmed that he is no longer able to function adequately on a day-to-day basis because of his current visual condition.    Further, it is my medical opinion that the cataract is the primary cause, or at least a  significantly contributory cause of his visual dysfunction. With uncomplicated cataract surgery and lens implantation, it is my expectation that his visual function and quality of life will improve, significantly.    The risks, benefits, alternatives, personnel and complications of cataract surgery with lens implantation were discussed with Chemo Polanco in detail. He appeared to understand and asked that I proceed with plans for surgery.    Upon eye examination, patient was found to have a visually significant cataract Right Eye. Discussed cataract surgery with patient and different intraocular lens implant options with patient: basic monofocal intraocular lens implant, Toric intraocular lens implant, and presbyopia correction intraocular lens implant. In my medical opinion, based on medical history and ocular examination, cataract surgery with intraocular lens implant will correct patient's vision and improve quality of patient's daily living activities. Patient wishes to have traditional cataract surgery with basic intraocular lens Right Eye. Patient wishes to have cataract surgery with the option stated above. Patient understands that an intraocular lens implant does not necessarily replace the need for glasses. Patient understands that it is impossible for the surgeon to inform him/her of every possible complication that may occur. The surgeon has answered all of the patient's questions. Patient understands that if he/she has a mature or dense cataract, pseudoexfoliation cataract, or history of use of Flomax, he/she may require the use of Maluyugin Ring and/or Vision Blue during surgery. Patient understands the risks, benefits, and alternatives to surgery.    Plan Cataract Surgery with Monofocal Intraocular Lens Implant right Eye on 11/09/2023 at [facility].    Current Ophthalmic Meds        fluorometholone (FML LIQUID FILM) 0.1 % ophthalmic suspension Use 1 Drop in the right  eye three times a day.    Continue:  Systane Complete solution instill 1 drop 3 times daily Both Eyes.    PHYSICAL EXAM:    Vital Signs:  Blood pressure 138/78, pulse (!) 56.    Respiratory:  Normal breath sounds, no wheezing.    CARD:  Normal heart sounds 1 & 2, normal sinus rhythm.    2. Status post cataract extraction and insertion of intraocular lens of left eye - ICD9: V45.61, V43.1, ICD10: Z98.42, Z96.1    Current Ophthalmic Meds        keTORolac (ACULAR) 0.5 % ophthalmic solution Use 1 Drop in the right eye three times a day.  prednisoLONE acetate (PRED FORTE) 1 % ophthalmic suspension Use 1 Drop in the left eye three times a day.    3. Essential hypertension - ICD9: 401.9, ICD10: I10    Continue to monitor with primary care physician.    4. Benign prostatic hyperplasia, unspecified whether lower urinary tract symptoms present - ICD9: 600.00, ICD10: N40.0    Continue to monitor with primary care physician.    Vijay Garcia MD  I have confirmed and edited as necessary the relevant ophthalmic history, review of systems, surgical history, and ophthalmological examination findings as obtained by the ophthalmic technical staff. I have seen and examined Chemo Polanco. I have discussed the examination findings, diagnosis, and treatment options with Chemo Polanco and/or his family. I have also reviewed and agree with the assessment and plan as stated above and agree with all its relevant components. I gave the patient the opportunity to ask questions about the findings, diagnosis, and treatment options.      Electronically signed by Vijay Garcia MD at 10/31/2023 11:35 AM EDT   Plan of Treatment  - documented as of this encounter  Plan of Treatment - Upcoming Encounters  Upcoming Encounters  Date Type Department Care Team Description   11/10/2023 11:15 AM EST Office Visit [facility] Ophthalmology   [address] [id number]   [phone]  Vijay Garcia MD   [address] [place] [address]    [phone] (Work)   [phone] (Fax)  1 day po 2nd RE     Plan of Treatment - Scheduled Procedures  Scheduled Procedures  Name Priority Associated Diagnoses Date/Time   SURGERY AT NON-[facility] FAC[facility]   Combined form of age-related cataract, right eye  11/09/2023 9:50 AM EST     Visit Diagnoses  - documented in this encounter  Visit Diagnoses  Diagnosis   Combined forms of age-related cataract of right eye - Primary   Other and combined forms of senile cataract    Status post cataract extraction and insertion of intraocular lens of left eye    Essential hypertension   Unspecified essential hypertension    Benign prostatic hyperplasia, unspecified whether lower urinary tract symptoms present      Discontinued Medications  - documented as of this encounter  Discontinued Medications  Medication Sig Discontinue Reason Start Date End Date   keTORolac (ACULAR) 0.5 % ophthalmic solution  Use 1 Drop in the right eye four times daily.   10/05/2023 10/31/2023   prednisoLONE acetate (PRED FORTE) 1 % ophthalmic suspension  Use 1 Drop in the left eye four times daily.   10/05/2023 10/31/2023     Eye Exam    Eye Exam - Visual Acuity  Visual Acuity    Right eye Left eye   Dist sc   20/20   Dist cc 20/50       Eye Exam - Tonometry (Applanation, 11:13 AM)  Tonometry (Applanation, 11:13 AM)    Right eye Left eye   Pressure 16 16     Eye Exam - Pupils  Pupils    Pupils APD   Right eye PERRL None   Left eye PERRL None     Eye Exam - Visual Fields (Counting fingers)  Visual Fields (Counting fingers)    Right eye Left eye     Full Full     Eye Exam - Extraocular Movement  Extraocular Movement    Right eye Left eye     Full Full     Eye Exam - Neuro/Psych  Neuro/Psych  Oriented x3: Yes   Mood/Affect: Normal      LENSTAR  Right eye: AL 24.09 ACD 3.16 WTW 12.18         Eye Exam - External Exam  External Exam    Right eye Left eye   External Normal including orbits and preauricular lymph nodes Normal including orbits and preauricular  lymph nodes     Eye Exam - Slit Lamp Exam  Slit Lamp Exam    Right eye Left eye   Lids/Lashes Normal lids, lashes, lacrimal glands, and lacrimal drainage Normal lids, lashes, lacrimal glands, and lacrimal drainage   Conjunctiva/Sclera White and quiet White and quiet   Cornea Punctate epithelial keratitis Normal epithelium, stroma, endothelium, and tear film   Anterior Chamber Deep and quiet Deep and quiet   Iris Round and reactive Round and reactive   Lens 3+ Nuclear sclerotic cataract, 3+ Posterior subcapsular cataract Posterior chamber intraocular lens   Anterior Vitreous Normal Normal     Eye Exam - Fundus Exam  Fundus Exam    Right eye Left eye   Disc Normal Normal   C/D Ratio 0.1 0.1   Macula Normal Normal   Vessels Normal Normal   Periphery Normal Normal     Eye Exam - Wearing Rx  Wearing Rx    Sphere Cylinder Axis Add   Right eye -0.75 +1.25 160 +2.00   Left eye             Eye Exam  Type: PAL     Eye Exam - Manifest Refraction  Manifest Refraction    Sphere Cylinder Axis Dist VA   Right eye           Left eye -0.50 +0.00 000 20/20     Care Teams  - documented as of this encounter  Care Teams  Team Member Relationship Specialty Start Date End Date   Demetra Trevino, CNP   NPI: [id number]   [address] [place] [address]   [phone] (Work)   [phone] (Fax)  PCP - General Family Medicine 9/8/23     Nico Flood, OD   NPI: [id number]   [address] [address]   [phone] (Work)   [phone] (Fax)    Optometry 10/31/23

## 2023-11-09 NOTE — ANESTHESIA PREPROCEDURE EVALUATION
Patient: Chemo Polanco    Procedure Information       Anesthesia Start Date/Time: 11/09/23 1257    Procedure: Phacoemulsification Cataract with Insertion Intraocular Lens (Right: Eye)    Location: [facility] OR  / [facility] OR    Surgeons: Vijay Garcia MD            Relevant Problems   Anesthesia (within normal limits)      Cardiovascular   (+) Benign hypertension      Endocrine   (+) Class 2 severe obesity with serious comorbidity and body mass index (BMI) of 37.0 to 37.9 in adult (CMS/HCC)      GI (within normal limits)      /Renal (within normal limits)      Neuro/Psych   (+) Anxiety with depression      Pulmonary (within normal limits)      GI/Hepatic (within normal limits)      Hematology (within normal limits)      Musculoskeletal (within normal limits)      Eyes, Ears, Nose, and Throat (within normal limits)      Infectious Disease (within normal limits)       Clinical information reviewed:   Tobacco  Allergies  Meds   Med Hx  Surg Hx   Fam Hx  Soc Hx        NPO Detail:  NPO/Void Status  Carbonhydrate Drink Given Prior to Surgery? : N  Date of Last Liquid: 11/08/23  Time of Last Liquid: 2000  Date of Last Solid: 11/08/23  Time of Last Solid: 2000  Last Intake Type: Clear fluids  Time of Last Void: 1145         Physical Exam    Airway  Mallampati: II  TM distance: >3 FB  Neck ROM: full     Cardiovascular   Rhythm: regular  Rate: normal     Dental - normal exam     Pulmonary   Breath sounds clear to auscultation     Abdominal            Anesthesia Plan    ASA 2     MAC     The patient is not a current smoker.  Patient was not previously instructed to abstain from smoking on day of procedure.  Patient did not smoke on day of procedure.    Anesthetic plan and risks discussed with patient.

## 2023-11-09 NOTE — DISCHARGE INSTRUCTIONS
Please see enclosed instructions from Dr. Garcia regarding eye drop schedule, restrictions and use of eye shield.    Please take bag with eye drops that were given to you today as well as ALL eye drops that you are using at home with you to your appointment tomorrow at Dr. Garcia's office.     Please see enclosed sedation information

## 2023-11-09 NOTE — ANESTHESIA POSTPROCEDURE EVALUATION
Patient: Chemo Polanco    Procedure Summary       Date: 11/09/23 Room / Location: [facility] OR 05 / [facility]    Anesthesia Start: 1257 Anesthesia Stop: 1320    Procedure: Phacoemulsification Cataract with Insertion Intraocular Lens (Right: Eye) Diagnosis:       Combined forms of age-related cataract of right eye      (Combined forms of age-related cataract of right eye [H25.811])    Surgeons: Vijay Garcia MD Responsible Provider: Jerome Valiente MD    Anesthesia Type: MAC ASA Status: 2            Anesthesia Type: MAC    Vitals Value Taken Time   BP  11/09/23 1325   Temp  11/09/23 1325   Pulse  11/09/23 1325   Resp  11/09/23 1325   SpO2  11/09/23 1325       Anesthesia Post Evaluation    Patient location during evaluation: PACU  Patient participation: complete - patient participated  Level of consciousness: awake and alert  Pain score: 3  Pain management: adequate  Multimodal analgesia pain management approach  Airway patency: patent  Cardiovascular status: acceptable  Respiratory status: acceptable  Hydration status: acceptable        No notable events documented.

## 2023-11-09 NOTE — OP NOTE
Phacoemulsification Cataract with Insertion Intraocular Lens (R) Operative Note     Date: 2023  OR Location: [facility] OR    Name: Chemo Polanco, : 1950, Age: 73 y.o., MRN: [id number], Sex: male    Diagnosis  Pre-op Diagnosis     * Combined forms of age-related cataract of right eye [H25.811] Post-op Diagnosis     * Combined forms of age-related cataract of right eye [H25.811]     Procedures  Phacoemulsification Cataract with Insertion Intraocular Lens  73578 - WA XCAPSL CTRC RMVL INSJ IO LENS PROSTH W/O ECP      Surgeons      * Vijay Garcia - Primary    Resident/Fellow/Other Assistant:  Surgeon(s) and Role:    Procedure Summary  Anesthesia: Monitor Anesthesia Care  ASA: II  Anesthesia Staff: Anesthesiologist: Jerome Valiente MD  Estimated Blood Loss: None  Intra-op Medications: * No intraprocedure medications in log *    Specimen: No specimens collected     Staff:   Circulator: Lizette Sykes RN  Relief Circulator: Juan Ramon Swanson RN  Scrub Person: Jarad Mason RN    Drains and/or Catheters: * None in log *    Implants:  Implants       Type Name Action Serial No.      Lens LENS, INTRAOCULAR, SN60WF 19.0 ALOK - [id number] - [id number] Implanted [id number]              Findings: Combined Form Age Related Cataract Right Eye    Indications: Chemo Polanco is an 73 y.o. male who is having surgery for Combined forms of age-related cataract of right eye [H25.811]. Difficulty seeing to read and watch TV. Difficulty to see when driving and complains of glare.    The patient was seen in the preoperative area. The risks, benefits, complications, treatment options, non-operative alternatives, expected recovery and outcomes were discussed with the patient. The possibilities of reaction to medication, pulmonary aspiration, injury to surrounding structures, bleeding, recurrent infection, the need for additional procedures, failure to diagnose a condition, and creating a complication requiring transfusion or  operation were discussed with the patient. The patient concurred with the proposed plan, giving informed consent.  The site of surgery was properly noted/marked if necessary per policy. The patient has been actively warmed in preoperative area. Preoperative antibiotics are not indicated. Venous thrombosis prophylaxis are not indicated.    Procedure Details:   The patient was correctly identified and the patient's operative eye was marked with a marking pen and verified with the patient in the pre-operative area.   The operative eye was dilated in the preoperative area.  The patient was then taken to the operating room where timeout was performed before starting the procedure. Combined anesthesia  with intravenous sedation and topical tetracaine eyedrops were instilled into the right eye. The operative eye  was prepped and draped in the standard sterile ophthalmic fashion in  preparation for ophthalmic surgery.  A Roman wire speculum was then inserted between the eyelids of the right eye and the operating microscope was placed over the right eye.  A paracentesis incision was made approximately 30° away from the planned surgical incision site with the help of MVR blade.  1% lidocaine MPF with Phenylephrine 1.5% PF was injected into the anterior chamber through the paracentesis incision. A near limbal clear corneal incision was fashioned in the temporal quadrant just outside the vascular arcade and Viscoat was injected into anterior chamber to firm the eye. A bent needle cystotome was used and Utrata forceps were utilized to create a continuous curvilinear capsulorrhexis.  BSS was injected beneath the anterior capsule to hydrodissect the nucleus from adjacent cortex and capsule.  The residual cortex was then aspirated with irrigation/aspiration handpiece. The posterior capsule was then polished with the help of soft irrigation-aspiration tip.  Provisc viscoelastic was then injected into the eye to reform the  anterior chamber and to open the capsular bag.  The intraocular lens implant was taken from its sterile wrapping, inspected under the surgical microscope and found to be in good condition. The intraocular lens implant 19.0D was injected into the capsule bag. The Provisc was then aspirated from the anterior chamber and from behind the intraocular lens implant.  The anterior chamber was inflated with the help of BSS to moderate tension.  And the edges of the surgical incision were then hydrated with the help of BSS.  Vigamox was then injected into the anterior chamber and into the capsule bag through the paracentesis incision. The surgical wound was then inspected and found to be watertight.  The wire speculum and drapes were then removed.  Pred Forte eyedrops, Acular eyedrops and Betadine 5% sterile ophthalmic solution were instilled in the conjunctival sac.     The patient tolerated the procedure well and was taken to recovery room in stable condition.    Complications:  None; patient tolerated the procedure well.    Disposition: home  Condition: stable     Attending Attestation: I performed the procedure.    Vijay Garcia  Phone Number: [phone]

## 2023-12-29 ENCOUNTER — OFFICE VISIT (OUTPATIENT)
Dept: URGENT CARE | Facility: CLINIC | Age: 73
End: 2023-12-29
Payer: MEDICARE

## 2023-12-29 VITALS
TEMPERATURE: 98 F | SYSTOLIC BLOOD PRESSURE: 131 MMHG | DIASTOLIC BLOOD PRESSURE: 73 MMHG | WEIGHT: 267 LBS | OXYGEN SATURATION: 98 % | RESPIRATION RATE: 16 BRPM | BODY MASS INDEX: 36.16 KG/M2 | HEART RATE: 81 BPM | HEIGHT: 72 IN

## 2023-12-29 DIAGNOSIS — J01.90 ACUTE SINUSITIS, RECURRENCE NOT SPECIFIED, UNSPECIFIED LOCATION: Primary | ICD-10-CM

## 2023-12-29 DIAGNOSIS — R05.1 ACUTE COUGH: ICD-10-CM

## 2023-12-29 PROCEDURE — 99213 OFFICE O/P EST LOW 20 MIN: CPT | Mod: 25 | Performed by: NURSE PRACTITIONER

## 2023-12-29 RX ORDER — DOXYCYCLINE 100 MG/1
100 TABLET ORAL 2 TIMES DAILY
Qty: 20 TABLET | Refills: 0 | Status: SHIPPED | OUTPATIENT
Start: 2023-12-29 | End: 2024-01-08

## 2023-12-29 NOTE — PROGRESS NOTES
[facility]  Lanie Guido, APRN-CNP     Visit Note - 12/29/2023 10:41 AM   This note was generated with voice recognition software and may contain errors including spelling, grammar, syntax, and misrecognization of what was dictated.    Patient: Chemo Polanco, MRN: [id number], 73 y.o., male   PCP: Niya Horvath MD  ------------------------------------  ALLERGIES: No Known Allergies     CURRENT MEDICATIONS:   Current Outpatient Medications   Medication Instructions    doxycycline (ADOXA) 100 mg, oral, 2 times daily, Take with a full glass of water and do not lie down for at least 30 minutes after.    lisinopriL-hydrochlorothiazide 10-12.5 mg tablet 2 tablets, oral, Daily    sertraline (ZOLOFT) 50 mg, oral, Daily    tamsulosin (FLOMAX) 0.4 mg, oral, Daily     ------------------------------------  PAST MEDICAL HX:  Patient Active Problem List   Diagnosis    Benign hypertension    Benign prostatic hyperplasia    Anxiety with depression    Class 2 severe obesity with serious comorbidity and body mass index (BMI) of 37.0 to 37.9 in adult (CMS/[facility])      SURGICAL HX:  Past Surgical History:   Procedure Laterality Date    CHOLECYSTECTOMY      HEMORRHOID SURGERY      HERNIA REPAIR      SPINE SURGERY      TOTAL KNEE ARTHROPLASTY Bilateral       FAMILY HX:   No pertinent history.   SOCIAL HX:    reports that he has quit smoking. His smoking use included cigarettes. He has never used smokeless tobacco. 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   ------------------------------------  CHIEF COMPLAINT:   Chief Complaint   Patient presents with    URI     Cough/congestion, sinus pressure, HA x 8 days       HISTORY OF PRESENT ILLNESS: The history was obtained from patient. Chemo is a 73 y.o. male, who presents with a chief complaint of sinus pressure, a productive cough (dark yellow phlegm), and nasal congestion (clear to yellow mucus) x 9 days. Has also had puffy, itchy eyes; had a headache  yesterday but this has resolved. Reports sxs started after he was out in the barn and exposed to a lot of dust and mold. Reports has had similar exposure/sxs in the past, and antibiotics were helpful. Denies any fever/chills, body aches, new rashes, abdominal pain, chest pain, wheezing/shortness of breath, urinary symptoms, nausea/vomiting, and diarrhea. No dizziness/lightheadedness. Appetite is normal; is able to eat and drink fluids without difficulty; denies loss of sense of taste or smell. Reports symptoms are unchanged since onset. Has been taking  Sudafed and Mucinex  without much relief; no other over-the-counter medications or home remedies for symptom management. No known ill contacts Has not received the COVID vaccine Has received this season's influenza vaccine. Last known COVID infection was in ~2021 . Is a former smoker. No recent antibiotic use.        REVIEW OF SYSTEMS:  10 systems reviewed negative with exception of history of present illness as listed above.    TODAY'S VITALS: /73 (BP Location: Right arm, Patient Position: Sitting, BP Cuff Size: Large adult)   Pulse 81   Temp 36.7 °C (98 °F) (Temporal)   Resp 16   Ht 1.829 m (6')   Wt 121 kg (267 lb)   SpO2 98%   BMI 36.21 kg/m²     PHYSICAL EXAMINATION:  General: Mildly ill-appearing, well nourished older male; alert and oriented, in no acute distress. + audible nasal congestion.  Eyes:  Pupils equal, round and reactive to light. No conjunctival erythema; no scleral icterus. No periorbital edema or rashes noted.   HENT: + frontal and maxillary sinus tenderness bilat, with audible nasal congestion. Bilat ear canals clear/unremarkable, but TMs with clear effusions bilat; no erythema, and not bulging. Nasal mucosa moderately boggy and edematous. Airway patent, oral mucosa moist. Posterior pharynx mildly injected but without vesicles or oropharyngeal exudate aside from PND. Uvula is midline. Trachea is midline. Managing oral secretions  without difficulty.  Neck:  Supple. Tender, mobile anterior cervical lymphadenopathy bilat.  Respiratory:  Lungs are clear to auscultation; no wheezes, rhonchi, or rales. Respirations unlabored, Breath sounds are equal, Symmetrical chest wall expansion. Loose, productive cough noted.   Cardiovascular:  Normal rate, Regular rhythm. Normal S1S2. No m/r/g. No peripheral edema.   Gastrointestinal:  Soft, non-tender, non-distended; no palpable masses or organomegaly. Bowel sounds normoactive.  Musculoskeletal:  Grossly normal  Integumentary:  Pink, warm, dry, and intact. No rashes or skin discoloration appreciated.    Neurologic:  Alert and oriented, no focal deficits, no motor or sensory deficits.    Cognition and Speech:  Oriented, Speech clear and coherent.    Psychiatric:  Cooperative, Appropriate mood & affect.       ------------------------------------  Medical Decision Making  LABORATORY or RADIOLOGICAL IMAGING ORDERS/RESULTS:   None.     IMPRESSION/PLAN:  Course: Worsening; stable    1. Acute sinusitis, recurrence not specified, unspecified location  2. Acute cough  - doxycycline (Adoxa) 100 mg tablet; Take 1 tablet (100 mg) by mouth 2 times a day for 10 days. Take with a full glass of water and do not lie down for at least 30 minutes after.  Dispense: 20 tablet; Refill: 0    Discussed COVID-19 testing today - pt declined, but urged precautionary measures and to consider home testing. No red flags on exam today. Symptoms consistent with sinusitis, although reviewed other potential etiologies. Due to severity/duration of sxs, will begin treatment for bacterial infection today (Doxycycline). Should finish full course of antibiotics, even if symptoms resolve more quickly. Instructed to push fluids, rest, and to use appropriate over the counter medications as needed for management of symptoms - discussed that Mucinex, vaporizer, and Saline Nasal Spray may be helpful.  Reviewed red flags to monitor for, counseled on  potential adverse reactions of treatments, expectations for improvement in sxs, and advised to follow-up with primary care provider in 3-5 days if symptoms persist, or sooner if worsening or if any additional concerns/red flags develop.  Patient verbalized understanding and agreed with plan of care; questions were encouraged and answered.     MARGARETTE Rocha-CNP   Advanced Practice Provider  [facility]

## (undated) DEVICE — Device